# Patient Record
Sex: FEMALE | Race: WHITE | NOT HISPANIC OR LATINO | Employment: FULL TIME | URBAN - METROPOLITAN AREA
[De-identification: names, ages, dates, MRNs, and addresses within clinical notes are randomized per-mention and may not be internally consistent; named-entity substitution may affect disease eponyms.]

---

## 2024-07-01 ENCOUNTER — OFFICE VISIT (OUTPATIENT)
Dept: ENDOCRINOLOGY | Facility: CLINIC | Age: 49
End: 2024-07-01
Payer: COMMERCIAL

## 2024-07-01 VITALS
TEMPERATURE: 98 F | OXYGEN SATURATION: 98 % | SYSTOLIC BLOOD PRESSURE: 132 MMHG | DIASTOLIC BLOOD PRESSURE: 68 MMHG | WEIGHT: 116.63 LBS | HEART RATE: 105 BPM

## 2024-07-01 DIAGNOSIS — E04.1 NODULAR THYROID DISEASE: ICD-10-CM

## 2024-07-01 DIAGNOSIS — R42 VERTIGO: ICD-10-CM

## 2024-07-01 DIAGNOSIS — E03.9 HYPOTHYROIDISM, UNSPECIFIED TYPE: Primary | ICD-10-CM

## 2024-07-01 DIAGNOSIS — R53.1 WEAKNESS: ICD-10-CM

## 2024-07-01 PROCEDURE — 1160F RVW MEDS BY RX/DR IN RCRD: CPT | Mod: CPTII,S$GLB,, | Performed by: PHYSICIAN ASSISTANT

## 2024-07-01 PROCEDURE — 3078F DIAST BP <80 MM HG: CPT | Mod: CPTII,S$GLB,, | Performed by: PHYSICIAN ASSISTANT

## 2024-07-01 PROCEDURE — 3075F SYST BP GE 130 - 139MM HG: CPT | Mod: CPTII,S$GLB,, | Performed by: PHYSICIAN ASSISTANT

## 2024-07-01 PROCEDURE — 99203 OFFICE O/P NEW LOW 30 MIN: CPT | Mod: S$GLB,,, | Performed by: PHYSICIAN ASSISTANT

## 2024-07-01 PROCEDURE — 1159F MED LIST DOCD IN RCRD: CPT | Mod: CPTII,S$GLB,, | Performed by: PHYSICIAN ASSISTANT

## 2024-07-01 PROCEDURE — 99999 PR PBB SHADOW E&M-NEW PATIENT-LVL III: CPT | Mod: PBBFAC,,, | Performed by: PHYSICIAN ASSISTANT

## 2024-07-01 RX ORDER — LEVOTHYROXINE SODIUM 25 UG/1
12.5 TABLET ORAL
COMMUNITY

## 2024-07-01 RX ORDER — OMEPRAZOLE 40 MG/1
40 CAPSULE, DELAYED RELEASE ORAL DAILY
COMMUNITY

## 2024-07-01 NOTE — PROGRESS NOTES
CC: Hypothyroidism    HPI: Beverly Dodd is a 49 y.o. female here for hypothyroidism along with pending conditions listed in the Visit Diagnosis. New to endocrine. Arrives with . Diagnosed in 3/24. No fhx of thyroid disease. On vd & iron. Periods are irregular. Duration is normal. On LT4 12.5 mcg qd (decreased dose ~1 mth ago). High dose caused palpitations. +TPO  Admitted twice for vertigo and weakness. ENT consulted w/o findings. Weakness involved only arm and part of face.     +fatigue, hot flashes, mood swings, insomnia, anxiety  No cold intolerance, mental fog, c/d, changes in hair or nails.     Nodular Thyroid Disease 2/24 [facility]  Ultrasound of the thyroid    The right lobe measures 4.5 x 1.5 x 1.7 cm. There are 2 nodules present. The largest measures 0.8 x 0.6 x 0.7 cm. The echotexture is heterogeneous.    The left lobe measures 4.6 x 1.6 x 1.3 cm. The echotexture is heterogeneous. There are 2 nodules present. The largest measures 0.5 x 0.3 x 0.4 cm.    The isthmus measures 0.1 cm    No sob, dysphagia or voice changes.    PMHx, PSHx: reviewed in epic.  Social Hx: no ETOH/tobacco use. Has 2 children. No issues with pregnancy.     Wt Readings from Last 10 Encounters:   07/01/24 52.9 kg (116 lb 10 oz)    3/24 124 lbs at [facility]    ROS:   Constitutional: + wt loss (8 lbs)  Eyes: No recent visual changes  Cardiovascular: Denies current anginal symptoms  Respiratory: Denies current respiratory difficulty  Gastrointestinal: Denies recent bowel disturbances  GenitoUrinary - No dysuria  Skin: No new skin rash  Neurologic: No focal neurologic complaints  Musculoskeletal: no joint pain  Endocrine: no polyphagia, polydipsia or polyuria  Remainder ROS negative     /68 (BP Location: Right arm, Patient Position: Sitting, BP Method: Medium (Manual))   Pulse 105   Temp 98.2 °F (36.8 °C) (Oral)   Wt 52.9 kg (116 lb 10 oz)   SpO2 98%      Personally reviewed labs below:    Anti-TPO Ab (RDL) 964.1 (H) <9.0 IU/mL      [facility]     LABS/IMAGING:  Recent Results     Collection Time: 03/05/24 9:50 AM   Result Value Ref Range   Vitamin B 12 1,146 232 - 1,245 pg/mL   Vitamin D 25 hydroxy   Collection Time: 03/05/24 9:50 AM   Result Value Ref Range   Vitamin D Total 27.0 (L) 30.0 - 100.0 ng/mL   Vitamin B1 (Thiamine), Blood   Collection Time: 03/05/24 9:50 AM   Result Value Ref Range   Vit. B1, Whole Blood 102.6 66.5 - 200.0 nmol/L   Zinc, Plasma or Serum   Collection Time: 03/05/24 9:50 AM   Result Value Ref Range   Zinc, Plasma or Serum 82 44 - 115 ug/dL   Magnesium   Collection Time: 03/05/24 9:50 AM   Result Value Ref Range   Magnesium Level 2.2 1.6 - 2.3 mg/dL   Vitamin B6, Plasma   Collection Time: 03/05/24 9:50 AM   Result Value Ref Range   Vitamin B6 22.4 3.4 - 65.2 ug/L   Folate   Collection Time: 03/05/24 9:50 AM   Result Value Ref Range   Folate Level >20.0 >3.0 ng/mL   Iron with Iron Binding Capacity (Includes Iron and IBC)   Collection Time: 03/05/24 9:50 AM   Result Value Ref Range   Iron Binding Capacity 333 250 - 450 ug/dL   UIBC 244 131 - 425 ug/dL   Iron Level 89 27 - 159 ug/dL   Percent Saturation 27 15 - 55 %   Ferritin   Collection Time: 03/05/24 9:50 AM   Result Value Ref Range   Ferritin Level 19 15 - 150 ng/mL   Phosphorus   Collection Time: 03/05/24 9:50 AM   Result Value Ref Range   Phosphorus Level 3.1 3.0 - 4.3 mg/dL   Vitamin A and E   Collection Time: 03/05/24 9:50 AM   Result Value Ref Range   Vitamin A 37.1 20.1 - 62.0 ug/dL   Vitamin E (Alpha-Tocopherol) 15.6 7.0 - 25.1 mg/L   Vitamin E (Gamma Tocopherol) 0.6 0.5 - 5.5 mg/L   Estradiol   Collection Time: 03/05/24 9:51 AM   Result Value Ref Range   Estradiol 13.7 pg/mL   Testosterone,Free and Total   Collection Time: 03/05/24 9:51 AM   Result Value Ref Range   Testosterone Serum Level 17 4 - 50 ng/dL   Free Testost Direct 2.2 0.0 - 4.2 pg/mL   Cortisol Level, AM   Collection Time: 03/05/24 9:51 AM   Result Value Ref Range   Cortisol - AM 18.1 6.2 -  "19.4 ug/dL   DHEA, Serum   Collection Time: 03/05/24 9:51 AM   Result Value Ref Range   Dehydroepiandrosterone (DHEA) 454 31 - 701 ng/dL     Lab Results   Component Value Date    TSH 1.560 04/22/2024        Chemistry    No results found for: "NA", "K", "CL", "CO2", "BUN", "CREATININE", "GLU" No results found for: "CALCIUM", "ALKPHOS", "AST", "ALT", "BILITOT", "ESTGFRAFRICA", "EGFRNONAA"       PE:  GENERAL: Well developed, well nourished  NECK: Supple neck, normal thyroid. No bruit  LYMPHATIC: No cervical or supraclavicular lymphadenopathy  CARDIOVASCULAR: Normal heart sounds, no pedal edema  RESPIRATORY: Normal effort, clear to auscultation  MUSC: 2+ DTR UE/LE  NEURO: steady gait, CN ll-Xll grossly intact  PSYCH: normal mood and affect      Assessment/Plan:   1. Hypothyroidism, unspecified type  TSH    T4, Free    T4, Free    TSH    Thyroid Peroxidase Antibody    T4, Free    TSH    Thyroid Peroxidase Antibody    CANCELED: Thyroid Peroxidase Antibody    CANCELED: TSH    CANCELED: T4, Free      2. Nodular thyroid disease  TSH    T4, Free    US Thyroid    Thyroid Peroxidase Antibody    Thyroid Peroxidase Antibody    CANCELED: Thyroid Peroxidase Antibody      3. Vertigo        4. Weakness          Hypothyroidism-TFTs today. Continue LT4 dose.  Nodular thyroid disease-stable-Thyroid u/s next year.   Vertigo/weakness-not thyroid related. Unlikely AI since weakness is not symmetric. Cortisol was 18. No hypotension on hyponatremia.    FOLLOWUP  TFTs  F/u in1 YEAR -tfts, u/s    "

## 2024-11-11 ENCOUNTER — PATIENT MESSAGE (OUTPATIENT)
Dept: ENDOCRINOLOGY | Facility: CLINIC | Age: 49
End: 2024-11-11
Payer: COMMERCIAL